# Patient Record
Sex: FEMALE | Race: WHITE | Employment: PART TIME | URBAN - METROPOLITAN AREA
[De-identification: names, ages, dates, MRNs, and addresses within clinical notes are randomized per-mention and may not be internally consistent; named-entity substitution may affect disease eponyms.]

---

## 2017-01-12 ENCOUNTER — THERAPY VISIT (OUTPATIENT)
Dept: PHYSICAL THERAPY | Facility: CLINIC | Age: 57
End: 2017-01-12

## 2017-01-12 DIAGNOSIS — M76.72 PERONEAL TENDONITIS OF LEFT LOWER EXTREMITY: Primary | ICD-10-CM

## 2017-01-12 PROCEDURE — 97161 PT EVAL LOW COMPLEX 20 MIN: CPT | Mod: GP | Performed by: PHYSICAL THERAPIST

## 2017-01-12 PROCEDURE — 97110 THERAPEUTIC EXERCISES: CPT | Mod: GP | Performed by: PHYSICAL THERAPIST

## 2017-01-12 NOTE — Clinical Note
[facility] ATHLETIC [facility]  [address]  [facility] [address]  [phone]    2017    Re: Karla Wallace   :   1960  MRN:  [id number]   REFERRING PHYSICIAN:   Rajiv Aquino DPM    [facility] ATHLETIC [facility]    Date of Initial Evaluation:  2017  Visits:  Rxs Used: 1  Reason for Referral:  Peroneal tendonitis of left lower extremity    EVALUATION SUMMARY    [facility] Athletic [facility] Initial Evaluation    Subjective:  Karla Wallace is a 56 year old female with a left ankle condition.  Condition occurred with:  Insidious onset.  Condition occurred: for unknown reasons.  This is a new condition  2016.   AMBULATESE WITH BOOT FOR THE PAST 2 1/2 MONTHS. .    Patient reports pain:  Lateral.    Pain is described as sharp and burning and is intermittent and reported as 7/10.  Associated symptoms:  Loss of motion/stiffness, loss of strength and edema. Pain is worse in the P.M..  Symptoms are exacerbated by walking and relieved by bracing/immobilizing and NSAID's.  Since onset symptoms are gradually improving.  Special tests:  MRI (PERONEAL TENDON SPLIT, STRESS Fx ).  Previous treatment: BOOT.  There was significant improvement following previous treatment.  General health as reported by patient is good.  Pertinent medical history includes:  Overweight.  Medical allergies: yes (CODIENE).  Other surgeries include:  Orthopedic surgery (LEFT KNEE ARTHROSCOPY. ).  Current medications:  Anti-inflammatory.  Current occupation .  Patient is working in normal job without restrictions.  Primary job tasks include:  Prolonged standing, repetitive tasks and lifting.  Barriers include:  None as reported by patient.  Red flags:  None as reported by patient.    Objective:  Ankle/Foot Evaluation  ROM:    PROM:    Dorsiflexion:  Left:    17     Right:   16   Plantarflexion:  Left:    35     Right:  48  Inversion: Left:      MODERATE   LIMITATION WITH ERP      Right:    Eversion: Left:  MODERTAE LIMITATION  WITH ERP.      Right:   Strength:    Plantarflexion: Left: 3-/5   Pain:   Right: 5/5  Pain:  Inversion:Left: /5 strong/painful Pain:     Right: 5/5  Strong/painful  Pain:  Eversion:Left: /5 strong/pain free Pain:  Right: 5/5  Pain:  Peroneals: Left: /5 strong/pain free Pain:      Re: Karla Wallace   :   1960    LIGAMENT TESTING:   Anterior Drawer (ATF) Left: neg   Anterior Drawer (ATF) Right: neg  Posterior Drawer (PTF) Left: neg   Posterior Drawer (PTF) Right: neg  Varus Stress (Calc Fib) Left: neg      Valgus Stress (Deltoid) Left: neg      Rotation (Deltoid) Left: neg      PALPATION:   Left ankle tenderness present at:  posterior tibialis and peroneals  Right ankle tenderness present at:   posterior tibialis  EDEMA:   Left ankle edema present at: general and 3/5      Figure 8 left: 3/5  MOBILITY TESTING:   Subtalar Left: hypomobile      Midtarsal Left: hypomobile      FUNCTIONAL TESTS:   Quad:  Single Leg Squat Left: Control is significant loss of control.  Single Leg Squat Right: Control is normal control    Assessment/Plan:    Patient is a 56 year old female with left side ankle complaints.    Patient has the following significant findings with corresponding treatment plan.                Diagnosis 1:  LEFT ANKLE PERONEAL TENDONITIS  Pain -  hot/cold therapy, US, electric stimulation, manual therapy, splint/taping/bracing/orthotics, self management, education, directional preference exercise and home program  Decreased ROM/flexibility - manual therapy, therapeutic exercise, therapeutic activity and home program  Decreased joint mobility - manual therapy and therapeutic exercise, NMR, therapeutic activityl  Decreased strength - therapeutic exercise, therapeutic activities and home program  Impaired gait - gait training, assistive devices and home program  Decreased function - therapeutic activities and home program    Therapy  Evaluation Codes:   1) History comprised of:   Personal factors that impact the plan of care:      Work status.    Comorbidity factors that impact the plan of care are:      Overweight.     Medications impacting care: None.  2) Examination of Body Systems comprised of:   Body structures and functions that impact the plan of care:      Ankle.   Activity limitations that impact the plan of care are:      Working.  3) Clinical presentation characteristics are:   Stable/Uncomplicated.  4) Decision-Making    Low complexity using standardized patient assessment instrument and/or measureable assessment of functional outcome.  Cumulative Therapy Evaluation is: Low complexity.    Re: Karla Wallace   :   1960    Previous and current functional limitations:  (See Goal Flow Sheet for this information)    Short term and Long term goals: (See Goal Flow Sheet for this information)     Communication ability:  Patient appears to be able to clearly communicate and understand verbal and written communication and follow directions correctly.  Treatment Explanation - The following has been discussed with the patient:   RX ordered/plan of care  Anticipated outcomes  Possible risks and side effects  This patient would benefit from PT intervention to resume normal activities.   Rehab potential is fair.    Frequency:  1 X week, once daily  Duration:  for 8 weeks  Discharge Plan:  Achieve all LTG.  Independent in home treatment program.  Reach maximal therapeutic benefit.      Thank you for your referral.    INQUIRIES  Therapist: Mandeep Cramer, PT  [facility]  [address]  [facility] [address]  Phone: [phone]  Fax: [phone]

## 2017-01-12 NOTE — PROGRESS NOTES
[facility] Initial Evaluation      Subjective:    Karla Wallace is a 56 year old female with a left ankle condition.  Condition occurred with:  Insidious onset.  Condition occurred: for unknown reasons.  This is a new condition  September 2016.     AMBULATESE WITH BOOT FOR THE PAST 2 1/2 MONTHS. .    Patient reports pain:  Lateral.    Pain is described as sharp and burning and is intermittent and reported as 7/10.  Associated symptoms:  Loss of motion/stiffness, loss of strength and edema. Pain is worse in the P.M..  Symptoms are exacerbated by walking and relieved by bracing/immobilizing and NSAID's.  Since onset symptoms are gradually improving.  Special tests:  MRI (PERONEAL TENDON SPLIT, STRESS Fx ).  Previous treatment: BOOT.  There was significant improvement following previous treatment.  General health as reported by patient is good.  Pertinent medical history includes:  Overweight.  Medical allergies: yes (CODIENE).  Other surgeries include:  Orthopedic surgery (LEFT KNEE ARTHROSCOPY. ).  Current medications:  Anti-inflammatory.  Current occupation .  Patient is working in normal job without restrictions.  Primary job tasks include:  Prolonged standing, repetitive tasks and lifting.    Barriers include:  None as reported by patient.    Red flags:  None as reported by patient.                      Objective:    System    Ankle/Foot Evaluation  ROM:      PROM:    Dorsiflexion:  Left:    17     Right:   16   Plantarflexion:  Left:    35     Right:  48  Inversion: Left:      MODERATE  LIMITATION WITH ERP      Right:    Eversion: Left:  MODERTAE LIMITATION  WITH ERP.      Right:           Strength:      Plantarflexion: Left: 3-/5   Pain:   Right: 5/5  Pain:  Inversion:Left: /5 strong/painful Pain:     Right: 5/5  Strong/painful  Pain:  Eversion:Left: /5 strong/pain free Pain:  Right: 5/5  Pain:          Peroneals: Left: /5 strong/pain free Pain:          LIGAMENT TESTING:    Anterior Drawer (ATF) Left: neg   Anterior Drawer (ATF) Right: neg  Posterior Drawer (PTF) Left: neg   Posterior Drawer (PTF) Right: neg  Varus Stress (Calc Fib) Left: neg      Valgus Stress (Deltoid) Left: neg      Rotation (Deltoid) Left: neg          PALPATION:   Left ankle tenderness present at:  posterior tibialis and peroneals  Right ankle tenderness present at:   posterior tibialis  EDEMA:   Left ankle edema present at: general and 3/5        Figure 8 left: 3/5  MOBILITY TESTING:         Subtalar Left: hypomobile      Midtarsal Left: hypomobile        FUNCTIONAL TESTS:         Quad:  Single Leg Squat Left: Control is significant loss of control.  Single Leg Squat Right: Control is normal control                                                            General     ROS    Assessment/Plan:      Patient is a 56 year old female with left side ankle complaints.    Patient has the following significant findings with corresponding treatment plan.                Diagnosis 1:  LEFT ANKLE PERONEAL TENDONITIS  Pain -  hot/cold therapy, US, electric stimulation, manual therapy, splint/taping/bracing/orthotics, self management, education, directional preference exercise and home program  Decreased ROM/flexibility - manual therapy, therapeutic exercise, therapeutic activity and home program  Decreased joint mobility - manual therapy and therapeutic exercise, NMR, therapeutic activityl  Decreased strength - therapeutic exercise, therapeutic activities and home program  Impaired gait - gait training, assistive devices and home program  Decreased function - therapeutic activities and home program    Therapy Evaluation Codes:   1) History comprised of:   Personal factors that impact the plan of care:      Work status.    Comorbidity factors that impact the plan of care are:      Overweight.     Medications impacting care: None.  2) Examination of Body Systems comprised of:   Body structures and functions that impact the  plan of care:      Ankle.   Activity limitations that impact the plan of care are:      Working.  3) Clinical presentation characteristics are:   Stable/Uncomplicated.  4) Decision-Making    Low complexity using standardized patient assessment instrument and/or measureable assessment of functional outcome.  Cumulative Therapy Evaluation is: Low complexity.    Previous and current functional limitations:  (See Goal Flow Sheet for this information)    Short term and Long term goals: (See Goal Flow Sheet for this information)     Communication ability:  Patient appears to be able to clearly communicate and understand verbal and written communication and follow directions correctly.  Treatment Explanation - The following has been discussed with the patient:   RX ordered/plan of care  Anticipated outcomes  Possible risks and side effects  This patient would benefit from PT intervention to resume normal activities.   Rehab potential is fair.    Frequency:  1 X week, once daily  Duration:  for 8 weeks  Discharge Plan:  Achieve all LTG.  Independent in home treatment program.  Reach maximal therapeutic benefit.    Please refer to the daily flowsheet for treatment today, total treatment time and time spent performing 1:1 timed codes.

## 2017-01-27 ENCOUNTER — THERAPY VISIT (OUTPATIENT)
Dept: PHYSICAL THERAPY | Facility: CLINIC | Age: 57
End: 2017-01-27

## 2017-01-27 DIAGNOSIS — M76.72 PERONEAL TENDONITIS OF LEFT LOWER EXTREMITY: Primary | ICD-10-CM

## 2017-01-27 PROCEDURE — 97530 THERAPEUTIC ACTIVITIES: CPT | Mod: GP | Performed by: PHYSICAL THERAPIST

## 2017-01-27 PROCEDURE — 97112 NEUROMUSCULAR REEDUCATION: CPT | Mod: GP | Performed by: PHYSICAL THERAPIST

## 2017-01-27 PROCEDURE — 97110 THERAPEUTIC EXERCISES: CPT | Mod: GP | Performed by: PHYSICAL THERAPIST

## 2017-09-26 PROBLEM — M76.72 PERONEAL TENDONITIS OF LEFT LOWER EXTREMITY: Status: RESOLVED | Noted: 2017-01-12 | Resolved: 2017-09-26

## 2017-09-26 NOTE — PROGRESS NOTES
Patient did not complete the planned course of treatment so current status is unknown.  Please see 1/27/2017 flowsheet for discharge status.  Discharge from physical therapy at this time.

## 2021-11-25 ENCOUNTER — OFFICE VISIT (OUTPATIENT)
Dept: URGENT CARE | Facility: URGENT CARE | Age: 61
End: 2021-11-25
Payer: COMMERCIAL

## 2021-11-25 VITALS
RESPIRATION RATE: 18 BRPM | SYSTOLIC BLOOD PRESSURE: 142 MMHG | HEART RATE: 63 BPM | WEIGHT: 242 LBS | OXYGEN SATURATION: 100 % | TEMPERATURE: 98.1 F | DIASTOLIC BLOOD PRESSURE: 71 MMHG

## 2021-11-25 DIAGNOSIS — H00.011 HORDEOLUM EXTERNUM OF RIGHT UPPER EYELID: Primary | ICD-10-CM

## 2021-11-25 PROCEDURE — 99204 OFFICE O/P NEW MOD 45 MIN: CPT | Performed by: FAMILY MEDICINE

## 2021-11-25 RX ORDER — POLYMYXIN B SULFATE AND TRIMETHOPRIM 1; 10000 MG/ML; [USP'U]/ML
1-2 SOLUTION OPHTHALMIC EVERY 4 HOURS
Qty: 10 ML | Refills: 0 | Status: SHIPPED | OUTPATIENT
Start: 2021-11-25

## 2021-11-25 RX ORDER — SULFAMETHOXAZOLE/TRIMETHOPRIM 800-160 MG
1 TABLET ORAL 2 TIMES DAILY
Qty: 20 TABLET | Refills: 0 | Status: SHIPPED | OUTPATIENT
Start: 2021-11-25 | End: 2021-12-05

## 2021-11-25 RX ORDER — MULTIVITAMIN/IRON/FOLIC ACID 18MG-0.4MG
1 TABLET ORAL DAILY
COMMUNITY

## 2021-11-25 RX ORDER — LOSARTAN POTASSIUM 25 MG/1
12.5 TABLET ORAL
COMMUNITY
Start: 2021-11-17 | End: 2022-11-12

## 2021-11-25 RX ORDER — BLOOD SUGAR DIAGNOSTIC
STRIP MISCELLANEOUS
COMMUNITY
Start: 2021-07-20

## 2021-11-25 RX ORDER — ASPIRIN 81 MG/1
81 TABLET, CHEWABLE ORAL
COMMUNITY
Start: 2021-11-17 | End: 2022-11-17

## 2021-11-25 RX ORDER — ATORVASTATIN CALCIUM 20 MG/1
1 TABLET, FILM COATED ORAL DAILY
COMMUNITY
Start: 2021-11-17 | End: 2022-11-17

## 2021-11-25 NOTE — PROGRESS NOTES
Eye swelling upper lid starting yesterday.  Does not believe got anything in it .  No foreign body sensation. No pain.     Took contact out after noticed this. No vision change.     Problem list, Medication list, Allergies, and Medical/Social/Surgical histories reviewed in [facility] and updated as appropriate.  Labs reviewed in [facility]  BP Readings from Last 3 Encounters:   11/25/21 (!) 142/71    Wt Readings from Last 3 Encounters:   11/25/21 109.8 kg (242 lb)                  Patient Active Problem List   Diagnosis   (none) - all problems resolved or deleted     No past surgical history on file.    Social History     Tobacco Use     Smoking status: Never Smoker     Smokeless tobacco: Never Used   Substance Use Topics     Alcohol use: Not on file     No family history on file.      Current Outpatient Medications   Medication Sig Dispense Refill     aspirin (ASA) 81 MG chewable tablet Take 81 mg by mouth       atorvastatin (LIPITOR) 20 MG tablet Take 1 tablet by mouth daily       blood glucose (ACCU-CHEK GUIDE) test strip Use  to test two times a day.       calcium carbonate-vitamin D (OYSTER SHELL CALCIUM/D) 500-200 MG-UNIT tablet        cholecalciferol 50 MCG (2000 UT) CAPS Take 2,000 Units by mouth       Glucos-MSM-C-Gn-Joyxxs-Leibew (GLUCOSAMINE MSM COMPLEX) TABS tablet Take by mouth daily       losartan (COZAAR) 25 MG tablet Take 12.5 mg by mouth       metFORMIN (GLUCOPHAGE) 500 MG tablet TAKE ONE TABLET BY MOUTH TWICE DAILY WITH MEALS       multivitamin w/minerals (CENTRUM ADULTS) tablet Take 1 tablet by mouth daily       sulfamethoxazole-trimethoprim (BACTRIM DS) 800-160 MG tablet Take 1 tablet by mouth 2 times daily for 10 days 20 tablet 0     trimethoprim-polymyxin b (POLYTRIM) 24107-9.1 UNIT/ML-% ophthalmic solution Place 1-2 drops into the right eye every 4 hours 10 mL 0     Allergies   Allergen Reactions     Codeine      Other reaction(s): Agitation, Confusion     Morphine Unknown     No lab results found.      ROS:  Constitutional, HEENT, cardiovascular, pulmonary, GI, , musculoskeletal, neuro, skin, endocrine and psych systems are negative, except as otherwise noted.        OBJECTIVE:  BP (!) 142/71   Pulse 63   Temp 98.1  F (36.7  C)   Resp 18   Wt 109.8 kg (242 lb)   SpO2 100%     EXAM:  GENERAL APPEARANCE: healthy, alert and no distress  Right eye lid with visible stye right upper eyelid.  Associated clear swelling upper lid with very faint erythema.     florescein exam revealed no corneal abrasions or foreign body.             ASSESSMENT AND PLAN    ASSESSMENT AND PLAN      1. Hordeolum externum of right upper eyelid    - trimethoprim-polymyxin b (POLYTRIM) 27626-6.1 UNIT/ML-% ophthalmic solution; Place 1-2 drops into the right eye every 4 hours  Dispense: 10 mL; Refill: 0  - sulfamethoxazole-trimethoprim (BACTRIM DS) 800-160 MG tablet; Take 1 tablet by mouth 2 times daily for 10 days  Dispense: 20 tablet; Refill: 0        Patient Instructions   Stye right eye with some associated swelling vs very early periobital cellulitis.     Alternate heat and warm packs, start antibiotic eye drops.     If not substantially improved in 24 hours or if worsening or fevers then start bactrim but at this point I suspect will not need that.             Patient Education     Sty (or Stye)  A sty is when the oil gland of the eyelid becomes inflamed. It may develop into an infection with a small pocket of pus (an abscess). This can cause pain, redness, and swelling. In early stages, a sty is treated with antibiotic cream, eye drops, or a small towel soaked in warm water (a warm compress). More severe cases may need to be opened and drained by a healthcare provider.   Home care    Eye drops or ointment are often prescribed to treat the infection. Use these as directed.     Artificial tears may also be used to lubricate the eye and make it more comfortable. You can buy these over the counter without a prescription. Talk with  your healthcare provider before using any over-the-counter treatment for a sty.    Apply a warm, damp towel to the affected area for at least 5 minutes, 3 to 4 times a day for a week. Warm compresses open the pores and speed the healing. Make sure the compresses are not too hot, as they may burn your eyelid.    Sometimes the sty will drain with this treatment alone. If this happens, keep using the antibiotic until all the redness and swelling are gone.    Wash your hands before and after touching the infected eyelid.    Don t squeeze or try to break open the sty.    Follow-up care  Follow up with your healthcare provider, or as advised.   When to seek medical advice  Call your healthcare provider or seek medical care right away if any of these occur:     Increase in swelling or redness around the eyelid after 48 to 72 hours    Increase in eye pain or the eyelid blisters    Increase in warmth--the eyelid feels hot    Drainage of blood or thick pus from the sty    Blister on the eyelid    Inability to open the eyelid due to swelling    Fever of 100.4 F (38 C) or above, or as directed by your provider    Vision changes    Headache or stiff neck    The sty comes back  [employer] last reviewed this educational content on 6/1/2020 2000-2021 [employer]. All rights reserved. This information is not intended as a substitute for professional medical care. Always follow your healthcare professional's instructions.           Patient Education     Periorbital Cellulitis  Periorbital cellulitis is an infection of the tissues around the eye. It's most often caused by an infected scratch, insect bite, or a sinus infection. If not treated, it may infect the eye. This can cause permanent changes in vision.   Home care  Take your antibiotics exactly as directed. Take all the medicine until it is finished.   Use pain medicine as prescribed. If no medicine was prescribed, you may use over-the-counter medicine as advised  to help with pain and fever. If you have liver disease or ever had a stomach ulcer, talk with your healthcare provider before using these.   Don't give ibuprofen to a child under 6 months of age. Never give aspirin to a child under age 18 years who has a fever or viral illness. It may cause severe illness or death from Reye syndrome.   Follow-up care  Follow up with your healthcare provider, or as advised.  When to seek medical advice  Call your healthcare provider right away if any of these occur:    Swelling or pain around the eye that gets worse    Redness that gets worse    Changes in vision    Fever of 100.4 (38  C) oral or 101.5 (38.6  C) rectal for more than 2 days on antibiotics  [employer] last reviewed this educational content on 7/1/2019 2000-2021 [employer]. All rights reserved. This information is not intended as a substitute for professional medical care. Always follow your healthcare professional's instructions.                 Joel Wegener, MD

## 2021-11-25 NOTE — PATIENT INSTRUCTIONS
Stye right eye with some associated swelling vs very early periobital cellulitis.     Alternate heat and warm packs, start antibiotic eye drops.     If not substantially improved in 24 hours or if worsening or fevers then start bactrim but at this point I suspect will not need that.             Patient Education     Sty (or Stye)  A sty is when the oil gland of the eyelid becomes inflamed. It may develop into an infection with a small pocket of pus (an abscess). This can cause pain, redness, and swelling. In early stages, a sty is treated with antibiotic cream, eye drops, or a small towel soaked in warm water (a warm compress). More severe cases may need to be opened and drained by a healthcare provider.   Home care    Eye drops or ointment are often prescribed to treat the infection. Use these as directed.     Artificial tears may also be used to lubricate the eye and make it more comfortable. You can buy these over the counter without a prescription. Talk with your healthcare provider before using any over-the-counter treatment for a sty.    Apply a warm, damp towel to the affected area for at least 5 minutes, 3 to 4 times a day for a week. Warm compresses open the pores and speed the healing. Make sure the compresses are not too hot, as they may burn your eyelid.    Sometimes the sty will drain with this treatment alone. If this happens, keep using the antibiotic until all the redness and swelling are gone.    Wash your hands before and after touching the infected eyelid.    Don t squeeze or try to break open the sty.    Follow-up care  Follow up with your healthcare provider, or as advised.   When to seek medical advice  Call your healthcare provider or seek medical care right away if any of these occur:     Increase in swelling or redness around the eyelid after 48 to 72 hours    Increase in eye pain or the eyelid blisters    Increase in warmth--the eyelid feels hot    Drainage of blood or thick pus from the  sty    Blister on the eyelid    Inability to open the eyelid due to swelling    Fever of 100.4 F (38 C) or above, or as directed by your provider    Vision changes    Headache or stiff neck    The sty comes back  [employer] last reviewed this educational content on 6/1/2020 2000-2021 [employer]. All rights reserved. This information is not intended as a substitute for professional medical care. Always follow your healthcare professional's instructions.           Patient Education     Periorbital Cellulitis  Periorbital cellulitis is an infection of the tissues around the eye. It's most often caused by an infected scratch, insect bite, or a sinus infection. If not treated, it may infect the eye. This can cause permanent changes in vision.   Home care  Take your antibiotics exactly as directed. Take all the medicine until it is finished.   Use pain medicine as prescribed. If no medicine was prescribed, you may use over-the-counter medicine as advised to help with pain and fever. If you have liver disease or ever had a stomach ulcer, talk with your healthcare provider before using these.   Don't give ibuprofen to a child under 6 months of age. Never give aspirin to a child under age 18 years who has a fever or viral illness. It may cause severe illness or death from Reye syndrome.   Follow-up care  Follow up with your healthcare provider, or as advised.  When to seek medical advice  Call your healthcare provider right away if any of these occur:    Swelling or pain around the eye that gets worse    Redness that gets worse    Changes in vision    Fever of 100.4 (38  C) oral or 101.5 (38.6  C) rectal for more than 2 days on antibiotics  [employer] last reviewed this educational content on 7/1/2019 2000-2021 [employer]. All rights reserved. This information is not intended as a substitute for professional medical care. Always follow your healthcare professional's instructions.